# Patient Record
Sex: FEMALE | Race: WHITE | NOT HISPANIC OR LATINO | Employment: FULL TIME | URBAN - METROPOLITAN AREA
[De-identification: names, ages, dates, MRNs, and addresses within clinical notes are randomized per-mention and may not be internally consistent; named-entity substitution may affect disease eponyms.]

---

## 2023-10-06 PROBLEM — N39.0 RECURRENT UTI: Status: ACTIVE | Noted: 2023-10-06

## 2023-10-06 PROBLEM — N95.8 GENITOURINARY SYNDROME OF MENOPAUSE: Status: ACTIVE | Noted: 2023-10-06

## 2023-10-06 PROBLEM — M89.8X2 PAIN OF LEFT HUMERUS: Status: ACTIVE | Noted: 2023-10-06

## 2023-10-06 PROBLEM — M75.80 ROTATOR CUFF TENDONITIS: Status: ACTIVE | Noted: 2023-10-06

## 2023-10-06 PROBLEM — S42.202K: Status: ACTIVE | Noted: 2023-10-06

## 2023-10-06 PROBLEM — R49.0 DYSPHONIA: Status: ACTIVE | Noted: 2023-10-06

## 2023-10-06 PROBLEM — S63.509A WRIST SPRAIN: Status: ACTIVE | Noted: 2023-10-06

## 2023-10-06 PROBLEM — N39.41 URGE INCONTINENCE OF URINE: Status: ACTIVE | Noted: 2023-10-06

## 2023-10-06 PROBLEM — N32.81 OVERACTIVE BLADDER: Status: ACTIVE | Noted: 2023-10-06

## 2023-10-06 PROBLEM — M62.81 MUSCLE WEAKNESS (GENERALIZED): Status: ACTIVE | Noted: 2023-10-06

## 2023-10-06 PROBLEM — R39.15 URINARY URGENCY: Status: ACTIVE | Noted: 2023-10-06

## 2023-10-06 PROBLEM — N39.3 STRESS INCONTINENCE, FEMALE: Status: ACTIVE | Noted: 2023-10-06

## 2023-10-06 PROBLEM — D23.72 OTHER BENIGN NEOPLASM OF SKIN OF LEFT LOWER LIMB, INCLUDING HIP: Status: ACTIVE | Noted: 2019-04-12

## 2023-10-06 PROBLEM — D48.5 NEOPLASM OF UNCERTAIN BEHAVIOR OF SKIN: Status: ACTIVE | Noted: 2019-04-12

## 2023-10-06 RX ORDER — MELOXICAM 15 MG/1
15 TABLET ORAL DAILY
COMMUNITY

## 2023-10-06 RX ORDER — ASCORBIC ACID 500 MG
500 TABLET ORAL DAILY
COMMUNITY

## 2023-10-06 RX ORDER — ESTRADIOL 0.1 MG/G
CREAM VAGINAL
COMMUNITY
Start: 2022-09-21 | End: 2023-12-05 | Stop reason: SDUPTHER

## 2023-10-06 RX ORDER — MULTIVITAMIN
TABLET ORAL
COMMUNITY
Start: 2022-09-21

## 2023-10-06 RX ORDER — FLUCONAZOLE 150 MG/1
150 TABLET ORAL
COMMUNITY
Start: 2022-09-22

## 2023-10-06 RX ORDER — PREDNISONE 20 MG/1
TABLET ORAL
COMMUNITY
Start: 2018-03-01

## 2023-10-06 RX ORDER — D-MANNOSE 99 %
POWDER (GRAM) MISCELLANEOUS
COMMUNITY
Start: 2022-09-21

## 2023-10-13 ENCOUNTER — OFFICE VISIT (OUTPATIENT)
Dept: ORTHOPEDIC SURGERY | Facility: CLINIC | Age: 48
End: 2023-10-13
Payer: COMMERCIAL

## 2023-10-13 ENCOUNTER — ANCILLARY PROCEDURE (OUTPATIENT)
Dept: RADIOLOGY | Facility: CLINIC | Age: 48
End: 2023-10-13
Payer: COMMERCIAL

## 2023-10-13 DIAGNOSIS — S63.501A SPRAIN OF RIGHT WRIST, INITIAL ENCOUNTER: Primary | ICD-10-CM

## 2023-10-13 DIAGNOSIS — S63.501A SPRAIN OF RIGHT WRIST, INITIAL ENCOUNTER: ICD-10-CM

## 2023-10-13 PROCEDURE — 99213 OFFICE O/P EST LOW 20 MIN: CPT | Performed by: FAMILY MEDICINE

## 2023-10-13 PROCEDURE — 73110 X-RAY EXAM OF WRIST: CPT | Mod: RT,FY

## 2023-10-13 PROCEDURE — 73110 X-RAY EXAM OF WRIST: CPT | Mod: RIGHT SIDE | Performed by: FAMILY MEDICINE

## 2023-10-13 NOTE — PROGRESS NOTES
Established Patient Follow-Up Visit    CC:   Chief Complaint   Patient presents with    Right Wrist - Follow-up     wrist injury with concern for occult fracture to hook of hamate.   DOI:9/23/23  Repeat xrays today       HPI:  Jackie is a 47 y.o. female returns here today for follow-up visit regarding: Right wrist pain contusion status post fall back on 23 September.  She denies any worsening pain or discomfort she is weaned from the brace she denies any issues or concerns other than a little bit of mild discomfort at times.  She is able to get back into her regular yoga and increase all activities.          REVIEW OF SYSTEMS:  GENERAL: Negative for malaise, significant weight loss, fever  MUSCULOSKELETAL: See HPI  NEURO: Negative for numbness / tingling       PHYSICAL EXAM:  -Neuro: Gross sensation intact to the upper extremities bilaterally.  -Extremity: Right wrist exam demonstrates skin which is warm pink well-perfused pulses and sensation are intact.   strength equal symmetric and intact no pain with pronation or supination normal wrist flexion extension no bony tenderness elicited on exam.  No snuffbox pain.  No pain over the Pisiform today.  Very minimal pain around the hook of the hamate but more in the soft tissues most noted at the flexor carpi radialis.  No obvious palpable ganglion cyst.  Forearm compartment soft compressible.    IMAGING: Repeat x-rays today, see dictated report.  XR wrist  Interpreted By:  MARCUS SALES DO  MRN: [id number]  Patient Name: JACKIE ALBA     STUDY:  WRIST COMPLT; MIN 3 VIEWS;  Right;  9/26/2023 8:32 am     INDICATION:  pain  M25.539: Wrist pain.     ACCESSION NUMBER(S):  [id number]     ORDERING CLINICIAN:  MARCUS SALES     FINDINGS:  X-rays of the right wrist show no obvious acute fracture or  dislocation. Questionable subtle transverse lucency through hook of  hamate noted on carpal tunnel view.         PROCEDURE: None  Procedures     ASSESSMENT:   Follow-up visit  for:  Problem List Items Addressed This Visit          Musculoskeletal and Injuries    Wrist sprain - Primary    Relevant Orders    XR wrist right 3+ views        PLAN: At this time we will have the patient continue to wean from her brace continue with increase activities as able and tolerated.  Discussed the possibility of providing her with an injection if necessary down the road in the soft tissues or around the wrist.  For now we will hold off on occupational therapy unless patient interested down the road.  She will increase her yoga and workouts to tolerance.  Were happy to see her back as needed down the road.  Orders Placed This Encounter    XR wrist right 3+ views           At the conclusion of the visit there were no further questions by the patient/family regarding their plan of care.  Patient was instructed to call or return with any issues, questions, or concerns regarding their injury and/or treatment plan described above.     10/13/23 at 2:13 PM - Cole C Budinsky, MD    Office: [phone]    This note was prepared using voice recognition software.  The details of this note are correct and have been reviewed, and corrected to the best of my ability.  Some grammatical errors may persist related to the Dragon software.

## 2023-10-31 ENCOUNTER — APPOINTMENT (OUTPATIENT)
Dept: UROLOGY | Facility: CLINIC | Age: 48
End: 2023-10-31
Payer: COMMERCIAL

## 2023-12-05 ENCOUNTER — OFFICE VISIT (OUTPATIENT)
Dept: UROLOGY | Facility: CLINIC | Age: 48
End: 2023-12-05
Payer: COMMERCIAL

## 2023-12-05 ENCOUNTER — TELEPHONE (OUTPATIENT)
Dept: UROLOGY | Facility: CLINIC | Age: 48
End: 2023-12-05

## 2023-12-05 VITALS
TEMPERATURE: 97.5 F | BODY MASS INDEX: 23.96 KG/M2 | HEART RATE: 63 BPM | WEIGHT: 167 LBS | DIASTOLIC BLOOD PRESSURE: 85 MMHG | SYSTOLIC BLOOD PRESSURE: 126 MMHG

## 2023-12-05 DIAGNOSIS — N39.0 RECURRENT UTI: ICD-10-CM

## 2023-12-05 DIAGNOSIS — R35.0 FREQUENCY OF URINATION: Primary | ICD-10-CM

## 2023-12-05 DIAGNOSIS — N95.8 GENITOURINARY SYNDROME OF MENOPAUSE: ICD-10-CM

## 2023-12-05 DIAGNOSIS — N39.41 URGE INCONTINENCE OF URINE: ICD-10-CM

## 2023-12-05 DIAGNOSIS — Z11.3 ROUTINE SCREENING FOR STI (SEXUALLY TRANSMITTED INFECTION): ICD-10-CM

## 2023-12-05 LAB
POC APPEARANCE, URINE: CLEAR
POC BILIRUBIN, URINE: NEGATIVE
POC BLOOD, URINE: NEGATIVE
POC COLOR, URINE: YELLOW
POC GLUCOSE, URINE: NEGATIVE MG/DL
POC KETONES, URINE: NEGATIVE MG/DL
POC LEUKOCYTES, URINE: NEGATIVE
POC NITRITE,URINE: NEGATIVE
POC PH, URINE: 7 PH
POC PROTEIN, URINE: NEGATIVE MG/DL
POC SPECIFIC GRAVITY, URINE: 1.02
POC UROBILINOGEN, URINE: 0.2 EU/DL

## 2023-12-05 PROCEDURE — 87800 DETECT AGNT MULT DNA DIREC: CPT

## 2023-12-05 PROCEDURE — 81003 URINALYSIS AUTO W/O SCOPE: CPT | Performed by: NURSE PRACTITIONER

## 2023-12-05 PROCEDURE — 99214 OFFICE O/P EST MOD 30 MIN: CPT | Performed by: NURSE PRACTITIONER

## 2023-12-05 PROCEDURE — 1036F TOBACCO NON-USER: CPT | Performed by: NURSE PRACTITIONER

## 2023-12-05 RX ORDER — ESTRADIOL 0.1 MG/G
CREAM VAGINAL
Qty: 42.5 G | Refills: 3 | Status: SHIPPED | OUTPATIENT
Start: 2023-12-05

## 2023-12-05 NOTE — PROGRESS NOTES
12/05/23   [id number]    Recurrent UTI, OAB, GSM, UUI, DIDI     Subjective      HPI Jackie Prajapati is a 48 y.o. female who presents for follow up recurrent UTI, OAB, GSM, UUI, DIDI; PFPT, Dmanose, didn't need further OAB tx w last appt. As she has been doing so well w PFPT; Life threatening fall in March 2023, woke up in ICU, Subarachnoid brain hemorrhage, fractured ribs;  3 pelvic fractures; no memory loss thankfully; no headaches; she reached out to [facility] PFPT during all this; needs PFPT order;     [facility] managing her irregular menses, Prometrium; insomnia, by summer barely sleeping, hot flashes; Aug confirmed menopause w labwork, thyroid normal; decided not to do MHT w heavy cancer in family; taking tylenol PM, magnesium, black cohosh, hot flashes are better significantly;     Discussed Veozah, may consider information given; discussed my concerns w black cohosh; She is finally sleeping;     UA neg, PVR 0 ml, some leakage, plans on getting back in PFPT once order in place.     [place] w boyfriend this past weekend, then broke up, would like to consider STI testing but concerned if too soon;           Objective     There were no vitals taken for this visit.   Physical Exam    General: Appears comfortable and in no apparent distress, well nourished  Head: Normocephalic, atraumatic  Neck: trachea midline  Respiratory: respirations unlabored, no wheezes, and no use of accessory muscles  Cardiovascular: at rest no dyspnea, well perfused  Skin: no visible rashes or lesions  Neurologic: grossly intact, oriented to person, place, and time  Psychiatric: mood and affect appropriate  Musculoskeletal: in chair for appt. no difficulty w upper body movement    Assessment/Plan   Problem List Items Addressed This Visit    None    No orders of the defined types were placed in this encounter.     OAB bladder retraining with  Pelvic floor physical therapy    Recurrent UTI and Genitourinary syndrome menopause  Vaginal estrogen cream  "pea size amount daily x 3 weeks, then 3 x per week    Will reach out after conferring w colleague how soon to do STI w intimacy few days ago    STI screening now and again in 3 mos    Nurse line [phone]    Call if interested Veozah    Follow up 3 mos    Urine: GC/CHLAM, Tric (nuclear acid detection)    Blood:   1. HIV antigen/antibody screen  2. Hep B surface antigen  3. Hep C antibody test  4. Syphilis screening w reflex  5. HSV IGG 1 & 2, Type I/II IGM             Wendy Thibodeaux, APRN-CNP  No results found for: \"GLUCOSE\", \"CALCIUM\", \"NA\", \"K\", \"CO2\", \"CL\", \"BUN\", \"CREATININE\"    "

## 2023-12-05 NOTE — TELEPHONE ENCOUNTER
Pt left message stating she was returning Wendy's call. She states she is not concerned about pregnancy because she has the Paraguard IUD in and she states she will go get the blood work done tomorrow, thanks.

## 2023-12-05 NOTE — PATIENT INSTRUCTIONS
OAB bladder retraining with  Pelvic floor physical therapy    Recurrent UTI and Genitourinary syndrome menopause  Vaginal estrogen cream pea size amount daily x 3 weeks, then 3 x per week    Will reach out after conferring w colleague how soon to do STI w intimacy few days ago    STI screening now and again in 3 mos    Nurse line [phone]    Call if interested Veozah    Follow up 3 mos    Urine: GC/CHLAM, Tric (nuclear acid detection)    Blood:   1. HIV antigen/antibody screen  2. Hep B surface antigen  3. Hep C antibody test  4. Syphilis screening w reflex  5. HSV IGG 1 & 2, Type I/II IGM

## 2023-12-06 ENCOUNTER — LAB (OUTPATIENT)
Dept: LAB | Facility: LAB | Age: 48
End: 2023-12-06
Payer: COMMERCIAL

## 2023-12-06 DIAGNOSIS — Z11.3 ROUTINE SCREENING FOR STI (SEXUALLY TRANSMITTED INFECTION): ICD-10-CM

## 2023-12-06 LAB
C TRACH RRNA SPEC QL NAA+PROBE: NEGATIVE
HBV SURFACE AG SERPL QL IA: NONREACTIVE
HCV AB SER QL: NONREACTIVE
HIV 1+2 AB+HIV1 P24 AG SERPL QL IA: NONREACTIVE
N GONORRHOEA DNA SPEC QL PROBE+SIG AMP: NEGATIVE

## 2023-12-06 PROCEDURE — 87389 HIV-1 AG W/HIV-1&-2 AB AG IA: CPT

## 2023-12-06 PROCEDURE — 86803 HEPATITIS C AB TEST: CPT

## 2023-12-06 PROCEDURE — 86694 HERPES SIMPLEX NES ANTBDY: CPT

## 2023-12-06 PROCEDURE — 86696 HERPES SIMPLEX TYPE 2 TEST: CPT

## 2023-12-06 PROCEDURE — 36415 COLL VENOUS BLD VENIPUNCTURE: CPT

## 2023-12-06 PROCEDURE — 86780 TREPONEMA PALLIDUM: CPT

## 2023-12-06 PROCEDURE — 87340 HEPATITIS B SURFACE AG IA: CPT

## 2023-12-06 PROCEDURE — 86695 HERPES SIMPLEX TYPE 1 TEST: CPT

## 2023-12-07 ENCOUNTER — TELEPHONE (OUTPATIENT)
Dept: UROLOGY | Facility: CLINIC | Age: 48
End: 2023-12-07
Payer: COMMERCIAL

## 2023-12-07 LAB
HERPES SIMPLEX VIRUS 1 IGG: >8 INDEX
HERPES SIMPLEX VIRUS 2 IGG: <0.2 INDEX
T PALLIDUM AB SER QL: NONREACTIVE

## 2023-12-07 NOTE — TELEPHONE ENCOUNTER
Detailed message left on pt VM that all tests except one are back and normal except for the HSV1, cold sore type.

## 2023-12-07 NOTE — TELEPHONE ENCOUNTER
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 ty  ----- Message -----  From: Lab, Background User  Sent: 12/6/2023  11:34 PM EST  To: DAKOTA Majano

## 2023-12-10 LAB — HSV1+2 IGM SER IA-ACNC: 1 IV

## 2024-03-05 ENCOUNTER — APPOINTMENT (OUTPATIENT)
Dept: UROLOGY | Facility: CLINIC | Age: 49
End: 2024-03-05
Payer: COMMERCIAL

## 2024-04-03 ENCOUNTER — OFFICE VISIT (OUTPATIENT)
Dept: ORTHOPEDIC SURGERY | Facility: CLINIC | Age: 49
End: 2024-04-03
Payer: COMMERCIAL

## 2024-04-03 DIAGNOSIS — M25.519 SHOULDER PAIN, UNSPECIFIED CHRONICITY, UNSPECIFIED LATERALITY: Primary | ICD-10-CM

## 2024-04-03 DIAGNOSIS — M25.519 SHOULDER PAIN, UNSPECIFIED CHRONICITY, UNSPECIFIED LATERALITY: ICD-10-CM

## 2024-04-03 PROCEDURE — 99214 OFFICE O/P EST MOD 30 MIN: CPT | Performed by: ORTHOPAEDIC SURGERY

## 2024-04-03 PROCEDURE — 20610 DRAIN/INJ JOINT/BURSA W/O US: CPT | Performed by: ORTHOPAEDIC SURGERY

## 2024-04-03 PROCEDURE — 99212 OFFICE O/P EST SF 10 MIN: CPT | Performed by: ORTHOPAEDIC SURGERY

## 2024-04-03 PROCEDURE — 2500000004 HC RX 250 GENERAL PHARMACY W/ HCPCS (ALT 636 FOR OP/ED): Performed by: ORTHOPAEDIC SURGERY

## 2024-04-03 PROCEDURE — 2500000005 HC RX 250 GENERAL PHARMACY W/O HCPCS: Performed by: ORTHOPAEDIC SURGERY

## 2024-04-03 RX ORDER — LIDOCAINE HYDROCHLORIDE 10 MG/ML
2 INJECTION INFILTRATION; PERINEURAL
Status: COMPLETED | OUTPATIENT
Start: 2024-04-03 | End: 2024-04-03

## 2024-04-03 RX ORDER — TRIAMCINOLONE ACETONIDE 40 MG/ML
40 INJECTION, SUSPENSION INTRA-ARTICULAR; INTRAMUSCULAR
Status: COMPLETED | OUTPATIENT
Start: 2024-04-03 | End: 2024-04-03

## 2024-04-03 RX ADMIN — TRIAMCINOLONE ACETONIDE 40 MG: 40 INJECTION, SUSPENSION INTRA-ARTICULAR; INTRAMUSCULAR at 10:14

## 2024-04-03 RX ADMIN — LIDOCAINE HYDROCHLORIDE 2 ML: 10 INJECTION, SOLUTION INFILTRATION; PERINEURAL at 10:14

## 2024-04-03 NOTE — PROGRESS NOTES
History of Present Illness   Patient returns today with right shoulder pain.   The patient has tried the following modalities: rest, ice, nsaids, prior csi and still endorses pain.    The pain is sharp in nature, localizes lateral and deep.  Better with rest.    Review of Systems   GENERAL: Negative for malaise, significant weight loss, fever  MUSCULOSKELETAL: see HPI  NEURO:  Negative    Physical Examination:  Right Shoulder:    Skin healthy to gross inspection  No ecchymosis, no swelling, no gross atrophy  No tenderness to palpation over acromioclavicular joint  + tenderness to palpation over biceps tendon  No tenderness to palpation over the cervical spine     ROM:  Full forward flexion  Full external rotation  IR to thoracic spine, symmetric to contralateral side  Strength:  5-/5 Resisted elevation  5/5 Resisted external rotation  Negative lift off test   Negative Spurling´s test  Positive Neer and Hawking´s test  Neurovascular exam normal distally      Assessment:  Patient with persistent right shoulder pain concern for long head of biceps superior labral pathology    Plan:  Based on the patient´s failure to improve we are concerned about biceps labrum versus impingement.  The patient elected for corticosteroid injection, MR arthrogram.      L Inj/Asp: R subacromial bursa on 4/3/2024 10:14 AM  Indications: pain  Details: 22 G needle, posterior approach  Medications: 2 mL lidocaine 10 mg/mL (1 %); 40 mg triamcinolone acetonide 40 mg/mL  Outcome: tolerated well, no immediate complications  Procedure, treatment alternatives, risks and benefits explained, specific risks discussed. Consent was given by the patient. Immediately prior to procedure a time out was called to verify the correct patient, procedure, equipment, support staff and site/side marked as required. Patient was prepped and draped in the usual sterile fashion.

## 2024-04-15 ENCOUNTER — OFFICE VISIT (OUTPATIENT)
Dept: UROLOGY | Facility: CLINIC | Age: 49
End: 2024-04-15
Payer: COMMERCIAL

## 2024-04-15 VITALS
SYSTOLIC BLOOD PRESSURE: 144 MMHG | DIASTOLIC BLOOD PRESSURE: 92 MMHG | TEMPERATURE: 97.2 F | HEART RATE: 52 BPM | HEIGHT: 70 IN | WEIGHT: 160 LBS | BODY MASS INDEX: 22.9 KG/M2

## 2024-04-15 DIAGNOSIS — Z11.3 SCREENING EXAMINATION FOR STI: ICD-10-CM

## 2024-04-15 DIAGNOSIS — N39.0 RECURRENT UTI: ICD-10-CM

## 2024-04-15 DIAGNOSIS — N32.81 OAB (OVERACTIVE BLADDER): Primary | ICD-10-CM

## 2024-04-15 DIAGNOSIS — N39.46 MIXED INCONTINENCE: ICD-10-CM

## 2024-04-15 DIAGNOSIS — N95.8 GENITOURINARY SYNDROME OF MENOPAUSE: ICD-10-CM

## 2024-04-15 LAB
POC APPEARANCE, URINE: CLEAR
POC BILIRUBIN, URINE: NEGATIVE
POC BLOOD, URINE: ABNORMAL
POC COLOR, URINE: YELLOW
POC GLUCOSE, URINE: NEGATIVE MG/DL
POC KETONES, URINE: NEGATIVE MG/DL
POC LEUKOCYTES, URINE: NEGATIVE
POC NITRITE,URINE: NEGATIVE
POC PH, URINE: 7.5 PH
POC PROTEIN, URINE: NEGATIVE MG/DL
POC SPECIFIC GRAVITY, URINE: 1.01
POC UROBILINOGEN, URINE: 0.2 EU/DL

## 2024-04-15 PROCEDURE — 81003 URINALYSIS AUTO W/O SCOPE: CPT | Performed by: NURSE PRACTITIONER

## 2024-04-15 PROCEDURE — 99213 OFFICE O/P EST LOW 20 MIN: CPT | Performed by: NURSE PRACTITIONER

## 2024-04-15 PROCEDURE — 87800 DETECT AGNT MULT DNA DIREC: CPT

## 2024-04-15 PROCEDURE — 87661 TRICHOMONAS VAGINALIS AMPLIF: CPT

## 2024-04-15 PROCEDURE — 1036F TOBACCO NON-USER: CPT | Performed by: NURSE PRACTITIONER

## 2024-04-15 NOTE — PROGRESS NOTES
"04/15/24   [id number]    OAB, recurrent UTI, GSM, STI testing     Subjective      HPI Jackie Prajapati is a 48 y.o. female who presents for follow up OAB, recurrent UTI, GSM, STI testing;     Has appt. W new GYN at [facility] where she also works; realizes now perimenopausal, intermittent menses;    New partner; vasectomy, he has older children;     UA mod heme on menses today  PVR 44 ml    No UTIs for a while even w intimacy, taking Dmanose, cranberry;    OAB bladder retraining with  Pelvic floor physical therapy, has the order didn't get started w holidays/winter, busy w work;     STI screening done was negative; planned to do repeat in 3 mos. New partner;        PMH, PSH,FH, SH reviewed    Imported from last notes on 12/5/23  who presents for follow up recurrent UTI, OAB, GSM, UUI, DIDI; PFPT, Dmanose, didn't need further OAB tx w last appt. As she has been doing so well w PFPT; Life threatening fall in March 2023, woke up in ICU, Subarachnoid brain hemorrhage, fractured ribs;  3 pelvic fractures; no memory loss thankfully; no headaches; she reached out to Neisha DELGADOPT during all this; needs PFPT order;      [facility] managing her irregular menses, Prometrium; insomnia, by summer barely sleeping, hot flashes; Aug confirmed menopause w labwork, thyroid normal; decided not to do MHT w heavy cancer in family; taking tylenol PM, magnesium, black cohosh, hot flashes are better significantly;      Discussed Veozah, may consider information given; discussed my concerns w black cohosh; She is finally sleeping;      UA neg, PVR 0 ml, some leakage, plans on getting back in PFPT once order in place.      [place] w boyfriend this past weekend, then broke up, would like to consider STI testing but concerned if too soon;        Objective     BP (!) 144/92   Pulse 52   Temp 36.2 °C (97.2 °F)   Ht 1.778 m (5' 10\")   Wt 72.6 kg (160 lb)   BMI 22.96 kg/m²    Physical Exam  General: Appears comfortable and in no apparent distress, well " nourished  Head: Normocephalic, atraumatic  Neck: trachea midline  Respiratory: respirations unlabored, no wheezes, and no use of accessory muscles  Cardiovascular: at rest no dyspnea, well perfused  Skin: no visible rashes or lesions  Neurologic: grossly intact, oriented to person, place, and time  Psychiatric: mood and affect appropriate  Musculoskeletal: in chair for appt. no difficulty w upper body movement      Assessment/Plan   Problem List Items Addressed This Visit          Genitourinary and Reproductive    Genitourinary syndrome of menopause    Recurrent UTI     Other Visit Diagnoses       OAB (overactive bladder)    -  Primary    Relevant Orders    POCT UA Automated manually resulted (Completed)    Mixed incontinence        Relevant Orders    Post-Void Residual (Completed)    Screening examination for STI        Relevant Orders    HIV 1/2 Antigen/Antibody Screen with Reflex to Confirmation    Hepatitis B Surface Antigen    Hepatitis C Antibody    Syphilis Screen with Reflex    HSV1 IgG and HSV2 IgG    C. Trachomatis / N. Gonorrhoeae, Amplified Detection    Trichomonas vaginalis, Nucleic Acid Detection          Orders Placed This Encounter   Procedures    Post-Void Residual    HIV 1/2 Antigen/Antibody Screen with Reflex to Confirmation     Standing Status:   Future     Standing Expiration Date:   4/15/2025     Order Specific Question:   Release result to MyChart     Answer:   Immediate [1]    Hepatitis B Surface Antigen     Standing Status:   Future     Standing Expiration Date:   4/15/2025     Order Specific Question:   Release result to MyChart     Answer:   Immediate [1]    Hepatitis C Antibody     Standing Status:   Future     Standing Expiration Date:   4/15/2025     Order Specific Question:   Release result to MyChart     Answer:   Immediate [1]    Syphilis Screen with Reflex     Standing Status:   Future     Standing Expiration Date:   4/15/2025     Order Specific Question:   Release result to  "MyChart     Answer:   Immediate [1]    HSV1 IgG and HSV2 IgG     Standing Status:   Future     Standing Expiration Date:   4/15/2025     Order Specific Question:   Release result to [facility]     Answer:   Immediate [1]    C. Trachomatis / N. Gonorrhoeae, Amplified Detection     Standing Status:   Future     Standing Expiration Date:   4/15/2025     Order Specific Question:   Release result to [facility]     Answer:   Immediate    Trichomonas vaginalis, Nucleic Acid Detection     Standing Status:   Future     Standing Expiration Date:   4/15/2025     Order Specific Question:   Release result to [facility]     Answer:   Immediate    POCT UA Automated manually resulted     Order Specific Question:   Release result to [facility]     Answer:   Immediate [1]      PFPT plans to get in with lazaro again  STI testing  Probioitic, Dmanose, vaginal estrogen cream pea size amount 3 x per week  Vulvar moisturizer VIA, Vmagic   Follow up 6 mos  Nurse line [phone]       Wendy Thibodeaux, APRN-CNP  No results found for: \"GLUCOSE\", \"CALCIUM\", \"NA\", \"K\", \"CO2\", \"CL\", \"BUN\", \"CREATININE\"    "

## 2024-04-15 NOTE — PATIENT INSTRUCTIONS
PFPT plans to get in with lazaro again  STI testing  Probioitic, Dmanose, vaginal estrogen cream pea size amount 3 x per week  Vulvar moisturizer VIA, Vmagic   Follow up 6 mos  Nurse line [phone]

## 2024-04-16 LAB
C TRACH RRNA SPEC QL NAA+PROBE: NEGATIVE
N GONORRHOEA DNA SPEC QL PROBE+SIG AMP: NEGATIVE
T VAGINALIS RRNA SPEC QL NAA+PROBE: NEGATIVE

## 2024-04-29 ENCOUNTER — APPOINTMENT (OUTPATIENT)
Dept: RADIOLOGY | Facility: CLINIC | Age: 49
End: 2024-04-29
Payer: COMMERCIAL

## 2024-05-01 ENCOUNTER — APPOINTMENT (OUTPATIENT)
Dept: RADIOLOGY | Facility: CLINIC | Age: 49
End: 2024-05-01
Payer: COMMERCIAL

## 2024-05-08 ENCOUNTER — HOSPITAL ENCOUNTER (OUTPATIENT)
Dept: RADIOLOGY | Facility: CLINIC | Age: 49
Discharge: HOME | End: 2024-05-08

## 2024-05-08 DIAGNOSIS — R92.1 MAMMOGRAPHIC CALCIFICATION FOUND ON DIAGNOSTIC IMAGING OF BREAST: ICD-10-CM

## 2024-05-08 DIAGNOSIS — R92.30 DENSE BREASTS, UNSPECIFIED: ICD-10-CM

## 2024-05-08 DIAGNOSIS — N60.11 DIFFUSE CYSTIC MASTOPATHY OF RIGHT BREAST: ICD-10-CM

## 2024-05-08 DIAGNOSIS — Z80.3 FAMILY HISTORY OF MALIGNANT NEOPLASM OF BREAST: ICD-10-CM

## 2024-05-08 DIAGNOSIS — N60.12 DIFFUSE CYSTIC MASTOPATHY OF LEFT BREAST: ICD-10-CM

## 2024-05-08 PROCEDURE — 2550000001 HC RX 255 CONTRASTS

## 2024-05-08 PROCEDURE — A9575 INJ GADOTERATE MEGLUMI 0.1ML: HCPCS

## 2024-05-08 PROCEDURE — 6100000003 BI MR BREAST BILATERAL WITH CONTRAST FAST SCREENING SELF PAY

## 2024-05-08 RX ORDER — GADOTERATE MEGLUMINE 376.9 MG/ML
15 INJECTION INTRAVENOUS
Status: COMPLETED | OUTPATIENT
Start: 2024-05-08 | End: 2024-05-08

## 2024-05-08 RX ADMIN — GADOTERATE MEGLUMINE 15 ML: 376.9 INJECTION INTRAVENOUS at 15:28

## 2024-05-13 ENCOUNTER — HOSPITAL ENCOUNTER (OUTPATIENT)
Dept: RADIOLOGY | Facility: EXTERNAL LOCATION | Age: 49
Discharge: HOME | End: 2024-05-13
Payer: COMMERCIAL

## 2024-10-15 ENCOUNTER — APPOINTMENT (OUTPATIENT)
Dept: UROLOGY | Facility: CLINIC | Age: 49
End: 2024-10-15
Payer: COMMERCIAL

## 2024-10-15 VITALS
TEMPERATURE: 97.1 F | HEART RATE: 60 BPM | DIASTOLIC BLOOD PRESSURE: 71 MMHG | SYSTOLIC BLOOD PRESSURE: 109 MMHG | BODY MASS INDEX: 22.9 KG/M2 | WEIGHT: 160 LBS | HEIGHT: 70 IN

## 2024-10-15 DIAGNOSIS — R35.0 FREQUENCY OF URINATION: ICD-10-CM

## 2024-10-15 DIAGNOSIS — N39.41 URGE INCONTINENCE OF URINE: ICD-10-CM

## 2024-10-15 DIAGNOSIS — N95.8 GENITOURINARY SYNDROME OF MENOPAUSE: ICD-10-CM

## 2024-10-15 DIAGNOSIS — N39.0 RECURRENT UTI: Primary | ICD-10-CM

## 2024-10-15 PROCEDURE — 3008F BODY MASS INDEX DOCD: CPT | Performed by: NURSE PRACTITIONER

## 2024-10-15 PROCEDURE — 99214 OFFICE O/P EST MOD 30 MIN: CPT | Performed by: NURSE PRACTITIONER

## 2024-10-15 NOTE — PROGRESS NOTES
10/15/24   [id number]    OAB, recurrent UTI, GSM     Subjective      HPI Jackie Prajapati is a 48 y.o. female who presents for follow up OAB, recurrent UTI, GSM,  last seen 4/15/24;    July right mastectomy for dcis; meeting w therapist and breast psychologist; planning December 13th fat grafting; just back to work yesterday; not on tamoxifen; hasn't used the estrogen cream but spoke w GYN;     Gynecologist, Yasmin Grissom; evaluated for bleeding; polyp; biopsy negative; not bleeding now;     Still in healthy amazing relationship since January 27th; no problems w UTI symptoms, 8-9 mos with no issues;  even w stress doing good, dmannose cranberry;     UA mod heme on menses last visit, PVR 44 ml last visit;     OAB bladder retraining with PFPT in past, some leakage but not overactive; not bothersome enough for OAB medication at this time;     Using two bonafide products relizen, serenol for mood, not interested in Veozah, prefers to use more natural products rather than prescription medications;     PMH, PSH,FH, SH reviewed    Imported from last notes on 12/5/23  who presents for follow up recurrent UTI, OAB, GSM, UUI, DIDI; PFPT, Dmanose, didn't need further OAB tx w last appt. As she has been doing so well w PFPT; Life threatening fall in March 2023, woke up in ICU, Subarachnoid brain hemorrhage, fractured ribs;  3 pelvic fractures; no memory loss thankfully; no headaches; she reached out to Ne[facility] during all this; needs PFPT order;      [facility] managing her irregular menses, Prometrium; insomnia, by summer barely sleeping, hot flashes; Aug confirmed menopause w labwork, thyroid normal; decided not to do MHT w heavy cancer in family; taking tylenol PM, magnesium, black cohosh, hot flashes are better significantly;      Discussed Veozah, may consider information given; discussed my concerns w black cohosh; She is finally sleeping;      UA neg, PVR 0 ml, some leakage, plans on getting back in PFPT once order in  "place.      [place] w boyfriend this past weekend, then broke up, would like to consider STI testing but concerned if too soon;        Objective     There were no vitals taken for this visit.   Physical Exam  General: Appears comfortable and in no apparent distress, well nourished  Head: Normocephalic, atraumatic  Neck: trachea midline  Respiratory: respirations unlabored, no wheezes, and no use of accessory muscles  Cardiovascular: at rest no dyspnea, well perfused  Skin: no visible rashes or lesions  Neurologic: grossly intact, oriented to person, place, and time  Psychiatric: mood and affect appropriate  Musculoskeletal: in chair for appt. no difficulty w upper body movement      Assessment/Plan   Problem List Items Addressed This Visit          Genitourinary and Reproductive    Genitourinary syndrome of menopause    Recurrent UTI - Primary    Urge incontinence of urine     Other Visit Diagnoses       Frequency of urination                No orders of the defined types were placed in this encounter.     Dr. Olena Esquivel, [facility] regarding if ok for vaginal estrogen cream, would like approval before using (MA will reach out to office)    Revaree by [employer], Via by [employer], agic vulva moisturizer  [email or web address] does not cancel sex symposium (sent)    Dmanose  Follow up 6 mos  Wendy Nurse line [phone]       6 mos OAB, GSM, UTIs  Wendy Thibodeaux, APRN-CNP  No results found for: \"GLUCOSE\", \"CALCIUM\", \"NA\", \"K\", \"CO2\", \"CL\", \"BUN\", \"CREATININE\"    "

## 2024-10-15 NOTE — PATIENT INSTRUCTIONS
Dr. Olena Esquivel, [facility] regarding if ok for vaginal estrogen cream, would like approval before using  Revaree by [place], Via by [employer], agic vulva moisturizer  [email or web address] cancer does not cancel sex symposium    Dmanose  Follow up 6 mos  Wendy Nurse line [phone]

## 2024-10-17 ENCOUNTER — TELEPHONE (OUTPATIENT)
Dept: UROLOGY | Facility: CLINIC | Age: 49
End: 2024-10-17
Payer: COMMERCIAL

## 2024-10-17 NOTE — TELEPHONE ENCOUNTER
Left message with  asking for someone in her office to return my call to inform me if the Dr is ok with Wendy prescribing vaginal estragon cream for UTI prevention.

## 2024-10-17 NOTE — TELEPHONE ENCOUNTER
----- Message from Wendy Thibodeaux sent at 10/15/2024  9:25 AM EDT -----  Please check w breast oncologist regarding ok to use vaginal estrogen cream  Dr. Olena Esquivel, [facility]     Thanks, Wendy

## 2024-10-18 DIAGNOSIS — N95.8 GENITOURINARY SYNDROME OF MENOPAUSE: ICD-10-CM

## 2024-10-18 RX ORDER — ESTRADIOL 0.1 MG/G
CREAM VAGINAL
Qty: 42.5 G | Refills: 3 | Status: SHIPPED | OUTPATIENT
Start: 2024-10-18

## 2024-10-18 NOTE — TELEPHONE ENCOUNTER
Margarita from Dr. Esquivel's office left VM stating that she spoke with Dr. Berger who confirmed it is ok for the pt to use vaginal estrogen. Please advise, thanks.

## 2025-04-15 ENCOUNTER — APPOINTMENT (OUTPATIENT)
Dept: UROLOGY | Facility: CLINIC | Age: 50
End: 2025-04-15
Payer: COMMERCIAL

## 2025-06-09 ENCOUNTER — APPOINTMENT (OUTPATIENT)
Dept: UROLOGY | Facility: CLINIC | Age: 50
End: 2025-06-09
Payer: COMMERCIAL

## 2025-06-09 VITALS — DIASTOLIC BLOOD PRESSURE: 80 MMHG | SYSTOLIC BLOOD PRESSURE: 127 MMHG | HEART RATE: 66 BPM

## 2025-06-09 DIAGNOSIS — N39.41 URGE INCONTINENCE OF URINE: ICD-10-CM

## 2025-06-09 DIAGNOSIS — R35.0 FREQUENCY OF URINATION: ICD-10-CM

## 2025-06-09 DIAGNOSIS — N95.8 GENITOURINARY SYNDROME OF MENOPAUSE: ICD-10-CM

## 2025-06-09 DIAGNOSIS — N95.1 VASOMOTOR SYMPTOMS DUE TO MENOPAUSE: ICD-10-CM

## 2025-06-09 DIAGNOSIS — N39.0 RECURRENT UTI: Primary | ICD-10-CM

## 2025-06-09 LAB
POC APPEARANCE, URINE: CLEAR
POC BILIRUBIN, URINE: NEGATIVE
POC BLOOD, URINE: NEGATIVE
POC COLOR, URINE: YELLOW
POC GLUCOSE, URINE: NEGATIVE MG/DL
POC KETONES, URINE: NEGATIVE MG/DL
POC LEUKOCYTES, URINE: NEGATIVE
POC NITRITE,URINE: NEGATIVE
POC PH, URINE: 7 PH
POC PROTEIN, URINE: NEGATIVE MG/DL
POC SPECIFIC GRAVITY, URINE: 1.01
POC UROBILINOGEN, URINE: 0.2 EU/DL

## 2025-06-09 PROCEDURE — 99213 OFFICE O/P EST LOW 20 MIN: CPT | Performed by: NURSE PRACTITIONER

## 2025-06-09 PROCEDURE — 81003 URINALYSIS AUTO W/O SCOPE: CPT | Performed by: NURSE PRACTITIONER

## 2025-06-09 RX ORDER — ACETAMINOPHEN 500 MG
10000 TABLET ORAL
COMMUNITY

## 2025-06-09 NOTE — PATIENT INSTRUCTIONS
Vasomotor symptoms  >referral Preeti Landaverde CBT    UTI prevention/intimacy  >Dmanose  >Vitamin C  >Vaginal estrogen    Incontinence  >PFPT w carmelina Brink, seen by her in the past  >Not interested in medication, would consider Botox or PTNS    Follow up Wendy 3 mos office or virtual

## 2025-06-09 NOTE — PROGRESS NOTES
06/09/25   [id number]    Follow up OAB, recurrent UTI, GSM     Subjective      HPI Jackie Prajapati is a 49 y.o. female who presents for follow up OAB, recurrent UTI, GSM,  last seen 10/15/24;    Last UTI 2022, happy with dmanose and vitamin C    Rough break up recently with boyfriend of past 18 mos, two difficult breast fat reconstruction surgeries; would like to address incontinence, discussed options, did well with PFPT in past prior to breast cancer, would like to try again;     July 2024 R mastectomy for dcis; Gynecologist, Yasmin Grissom; good with estrogen cream, also talked with her;     UA neg today PVR 44 ml last visit;       PMH, PSH,FH, SH reviewed      Objective     /80   Pulse 66    Physical Exam  General: Appears comfortable and in no apparent distress, well nourished  Head: Normocephalic, atraumatic  Neck: trachea midline  Respiratory: respirations unlabored, no wheezes, and no use of accessory muscles  Cardiovascular: at rest no dyspnea, well perfused  Skin: no visible rashes or lesions  Neurologic: grossly intact, oriented to person, place, and time  Psychiatric: mood and affect appropriate  Musculoskeletal: in chair for appt. no difficulty w upper body movement      Assessment/Plan   Problem List Items Addressed This Visit          Genitourinary and Reproductive    Genitourinary syndrome of menopause    Recurrent UTI - Primary    Relevant Orders    POCT UA Automated manually resulted (Completed)    Urge incontinence of urine    Relevant Orders    Referral to Physical Therapy     Other Visit Diagnoses         Frequency of urination          Vasomotor symptoms due to menopause        Relevant Orders    Referral to Psychology              Orders Placed This Encounter   Procedures    Referral to Physical Therapy     Standing Status:   Future     Expected Date:   6/9/2025     Expiration Date:   6/9/2026     Referral Priority:   Routine     Referral Type:   Consultation     Referral Reason:    "Specialty Services Required     Referred to Provider:   Lesley Webber, PT     Requested Specialty:   Physical Therapy     Number of Visits Requested:   1    Referral to Psychology     Standing Status:   Future     Expected Date:   6/9/2025     Expiration Date:   6/9/2026     Referral Priority:   Routine     Referral Type:   Consultation     Referral Reason:   Specialty Services Required     Referred to Provider:   Preeti Landaverde, PhD     Requested Specialty:   Psychology     Number of Visits Requested:   1    POCT UA Automated manually resulted     Release result to [facility]:   Immediate [1]      Vasomotor symptoms  >referral Preeti Landaverde CBT    UTI prevention/intimacy  >Dmanose  >Vitamin C  >Vaginal estrogen    Incontinence  >PFPT w carmelina Brink, seen by her in the past  >Not interested in medication, would consider Botox or PTNS  >brochures on botox and PTNS given    Follow up Wendy 3 mos office      Wendy Thibodeaux, APRN-CNP  No results found for: \"GLUCOSE\", \"CALCIUM\", \"NA\", \"K\", \"CO2\", \"CL\", \"BUN\", \"CREATININE\"    "

## 2025-08-28 ENCOUNTER — TELEPHONE (OUTPATIENT)
Dept: UROLOGY | Facility: CLINIC | Age: 50
End: 2025-08-28
Payer: COMMERCIAL

## 2025-08-28 DIAGNOSIS — R39.9 UTI SYMPTOMS: ICD-10-CM

## 2025-08-28 DIAGNOSIS — R35.0 FREQUENCY OF URINATION: Primary | ICD-10-CM

## 2025-08-28 DIAGNOSIS — Z11.3 SCREEN FOR STD (SEXUALLY TRANSMITTED DISEASE): ICD-10-CM

## 2025-08-28 RX ORDER — NITROFURANTOIN 25; 75 MG/1; MG/1
100 CAPSULE ORAL 2 TIMES DAILY
Qty: 14 CAPSULE | Refills: 0 | Status: SHIPPED | OUTPATIENT
Start: 2025-08-28 | End: 2025-08-29 | Stop reason: SDUPTHER

## 2025-08-29 RX ORDER — NITROFURANTOIN 25; 75 MG/1; MG/1
100 CAPSULE ORAL 2 TIMES DAILY
Qty: 14 CAPSULE | Refills: 0 | Status: SHIPPED | OUTPATIENT
Start: 2025-08-29 | End: 2025-09-03 | Stop reason: WASHOUT

## 2025-08-30 LAB
C TRACH RRNA SPEC QL NAA+PROBE: NOT DETECTED
N GONORRHOEA RRNA SPEC QL NAA+PROBE: NOT DETECTED
QUEST GC CT AMPLIFIED (ALWAYS MESSAGE): NORMAL
T VAGINALIS RRNA SPEC QL NAA+PROBE: NOT DETECTED

## 2025-08-31 LAB
APPEARANCE UR: CLEAR
BACTERIA #/AREA URNS HPF: ABNORMAL /HPF
BACTERIA UR CULT: ABNORMAL
BACTERIA UR CULT: ABNORMAL
BILIRUB UR QL STRIP: NEGATIVE
COLOR UR: YELLOW
GLUCOSE UR QL STRIP: NEGATIVE
HGB UR QL STRIP: NEGATIVE
HYALINE CASTS #/AREA URNS LPF: ABNORMAL /LPF
KETONES UR QL STRIP: NEGATIVE
LEUKOCYTE ESTERASE UR QL STRIP: ABNORMAL
NITRITE UR QL STRIP: NEGATIVE
PH UR STRIP: 7 [PH] (ref 5–8)
PROT UR QL STRIP: NEGATIVE
RBC #/AREA URNS HPF: ABNORMAL /HPF
SERVICE CMNT-IMP: ABNORMAL
SP GR UR STRIP: 1 (ref 1–1.03)
SQUAMOUS #/AREA URNS HPF: ABNORMAL /HPF
WBC #/AREA URNS HPF: ABNORMAL /HPF

## 2025-09-03 ENCOUNTER — APPOINTMENT (OUTPATIENT)
Dept: UROLOGY | Facility: CLINIC | Age: 50
End: 2025-09-03
Payer: COMMERCIAL

## 2025-09-03 ASSESSMENT — PATIENT HEALTH QUESTIONNAIRE - PHQ9
1. LITTLE INTEREST OR PLEASURE IN DOING THINGS: NOT AT ALL
2. FEELING DOWN, DEPRESSED OR HOPELESS: NOT AT ALL
SUM OF ALL RESPONSES TO PHQ9 QUESTIONS 1 AND 2: 0

## 2025-12-08 ENCOUNTER — APPOINTMENT (OUTPATIENT)
Dept: UROLOGY | Facility: CLINIC | Age: 50
End: 2025-12-08
Payer: COMMERCIAL